# Patient Record
Sex: MALE | Race: WHITE | NOT HISPANIC OR LATINO | URBAN - METROPOLITAN AREA
[De-identification: names, ages, dates, MRNs, and addresses within clinical notes are randomized per-mention and may not be internally consistent; named-entity substitution may affect disease eponyms.]

---

## 2023-09-26 ENCOUNTER — OFFICE (OUTPATIENT)
Dept: URBAN - METROPOLITAN AREA CLINIC 13 | Facility: CLINIC | Age: 48
End: 2023-09-26

## 2023-09-26 VITALS
OXYGEN SATURATION: 99 % | HEART RATE: 101 BPM | HEIGHT: 71 IN | WEIGHT: 212 LBS | SYSTOLIC BLOOD PRESSURE: 122 MMHG | DIASTOLIC BLOOD PRESSURE: 80 MMHG

## 2023-09-26 DIAGNOSIS — R19.5 OTHER FECAL ABNORMALITIES: ICD-10-CM

## 2023-09-26 PROCEDURE — 99204 OFFICE O/P NEW MOD 45 MIN: CPT | Performed by: INTERNAL MEDICINE

## 2023-10-10 ENCOUNTER — AMBULATORY SURGICAL CENTER (OUTPATIENT)
Dept: URBAN - METROPOLITAN AREA SURGERY 4 | Facility: SURGERY | Age: 48
End: 2023-10-10
Payer: COMMERCIAL

## 2023-10-10 ENCOUNTER — OFFICE (OUTPATIENT)
Dept: URBAN - METROPOLITAN AREA PATHOLOGY 1 | Facility: PATHOLOGY | Age: 48
End: 2023-10-10
Payer: COMMERCIAL

## 2023-10-10 VITALS
HEART RATE: 76 BPM | HEART RATE: 91 BPM | RESPIRATION RATE: 13 BRPM | DIASTOLIC BLOOD PRESSURE: 68 MMHG | RESPIRATION RATE: 16 BRPM | OXYGEN SATURATION: 98 % | RESPIRATION RATE: 16 BRPM | HEIGHT: 71 IN | WEIGHT: 204 LBS | DIASTOLIC BLOOD PRESSURE: 83 MMHG | SYSTOLIC BLOOD PRESSURE: 113 MMHG | DIASTOLIC BLOOD PRESSURE: 71 MMHG | SYSTOLIC BLOOD PRESSURE: 108 MMHG | OXYGEN SATURATION: 99 % | HEART RATE: 73 BPM | SYSTOLIC BLOOD PRESSURE: 108 MMHG | DIASTOLIC BLOOD PRESSURE: 69 MMHG | OXYGEN SATURATION: 95 % | WEIGHT: 204 LBS | HEART RATE: 76 BPM | SYSTOLIC BLOOD PRESSURE: 109 MMHG | SYSTOLIC BLOOD PRESSURE: 107 MMHG | HEART RATE: 69 BPM | SYSTOLIC BLOOD PRESSURE: 102 MMHG | DIASTOLIC BLOOD PRESSURE: 68 MMHG | RESPIRATION RATE: 14 BRPM | HEART RATE: 77 BPM | DIASTOLIC BLOOD PRESSURE: 81 MMHG | OXYGEN SATURATION: 98 % | RESPIRATION RATE: 12 BRPM | HEART RATE: 70 BPM | HEART RATE: 77 BPM | OXYGEN SATURATION: 99 % | RESPIRATION RATE: 14 BRPM | DIASTOLIC BLOOD PRESSURE: 87 MMHG | RESPIRATION RATE: 18 BRPM | DIASTOLIC BLOOD PRESSURE: 66 MMHG | RESPIRATION RATE: 10 BRPM | RESPIRATION RATE: 11 BRPM | DIASTOLIC BLOOD PRESSURE: 74 MMHG | RESPIRATION RATE: 11 BRPM | DIASTOLIC BLOOD PRESSURE: 87 MMHG | OXYGEN SATURATION: 97 % | HEART RATE: 62 BPM | SYSTOLIC BLOOD PRESSURE: 136 MMHG | SYSTOLIC BLOOD PRESSURE: 102 MMHG | SYSTOLIC BLOOD PRESSURE: 134 MMHG | SYSTOLIC BLOOD PRESSURE: 139 MMHG | SYSTOLIC BLOOD PRESSURE: 105 MMHG | DIASTOLIC BLOOD PRESSURE: 71 MMHG | OXYGEN SATURATION: 95 % | SYSTOLIC BLOOD PRESSURE: 100 MMHG | HEART RATE: 70 BPM | SYSTOLIC BLOOD PRESSURE: 113 MMHG | SYSTOLIC BLOOD PRESSURE: 105 MMHG | RESPIRATION RATE: 10 BRPM | SYSTOLIC BLOOD PRESSURE: 136 MMHG | OXYGEN SATURATION: 100 % | DIASTOLIC BLOOD PRESSURE: 72 MMHG | SYSTOLIC BLOOD PRESSURE: 119 MMHG | DIASTOLIC BLOOD PRESSURE: 83 MMHG | OXYGEN SATURATION: 97 % | RESPIRATION RATE: 9 BRPM | SYSTOLIC BLOOD PRESSURE: 109 MMHG | TEMPERATURE: 98.1 F | RESPIRATION RATE: 13 BRPM | HEART RATE: 68 BPM | RESPIRATION RATE: 12 BRPM | SYSTOLIC BLOOD PRESSURE: 119 MMHG | HEART RATE: 62 BPM | HEART RATE: 74 BPM | HEART RATE: 74 BPM | DIASTOLIC BLOOD PRESSURE: 74 MMHG | SYSTOLIC BLOOD PRESSURE: 139 MMHG | OXYGEN SATURATION: 100 % | DIASTOLIC BLOOD PRESSURE: 76 MMHG | HEART RATE: 91 BPM | RESPIRATION RATE: 9 BRPM | DIASTOLIC BLOOD PRESSURE: 76 MMHG | HEART RATE: 68 BPM | DIASTOLIC BLOOD PRESSURE: 66 MMHG | RESPIRATION RATE: 18 BRPM | SYSTOLIC BLOOD PRESSURE: 107 MMHG | SYSTOLIC BLOOD PRESSURE: 100 MMHG | TEMPERATURE: 98.1 F | DIASTOLIC BLOOD PRESSURE: 69 MMHG | DIASTOLIC BLOOD PRESSURE: 81 MMHG | SYSTOLIC BLOOD PRESSURE: 134 MMHG | HEIGHT: 71 IN | HEART RATE: 69 BPM | HEART RATE: 73 BPM | DIASTOLIC BLOOD PRESSURE: 72 MMHG

## 2023-10-10 DIAGNOSIS — R19.5 OTHER FECAL ABNORMALITIES: ICD-10-CM

## 2023-10-10 DIAGNOSIS — D12.3 BENIGN NEOPLASM OF TRANSVERSE COLON: ICD-10-CM

## 2023-10-10 DIAGNOSIS — D12.5 BENIGN NEOPLASM OF SIGMOID COLON: ICD-10-CM

## 2023-10-10 DIAGNOSIS — K57.30 DIVERTICULOSIS OF LARGE INTESTINE WITHOUT PERFORATION OR ABS: ICD-10-CM

## 2023-10-10 DIAGNOSIS — K63.5 POLYP OF COLON: ICD-10-CM

## 2023-10-10 DIAGNOSIS — C18.7 MALIGNANT NEOPLASM OF SIGMOID COLON: ICD-10-CM

## 2023-10-10 PROCEDURE — 45385 COLONOSCOPY W/LESION REMOVAL: CPT | Performed by: INTERNAL MEDICINE

## 2023-10-10 PROCEDURE — 43235 EGD DIAGNOSTIC BRUSH WASH: CPT | Performed by: INTERNAL MEDICINE

## 2023-10-10 PROCEDURE — 88305 TISSUE EXAM BY PATHOLOGIST: CPT | Performed by: PATHOLOGY

## 2023-10-10 PROCEDURE — 88341 IMHCHEM/IMCYTCHM EA ADD ANTB: CPT | Performed by: PATHOLOGY

## 2023-10-10 PROCEDURE — 88342 IMHCHEM/IMCYTCHM 1ST ANTB: CPT | Performed by: PATHOLOGY

## 2023-10-10 NOTE — SERVICENOTES
Right colon examined twice GI [employer] artificial intelligence technology used to enhance polyp detection

## 2023-10-13 LAB
PDF: PDF REPORT: (no result)
PDF: PDF REPORT: (no result)

## 2023-10-16 ENCOUNTER — OFFICE (OUTPATIENT)
Dept: URBAN - METROPOLITAN AREA CLINIC 13 | Facility: CLINIC | Age: 48
End: 2023-10-16

## 2023-10-16 VITALS
DIASTOLIC BLOOD PRESSURE: 100 MMHG | OXYGEN SATURATION: 98 % | SYSTOLIC BLOOD PRESSURE: 160 MMHG | WEIGHT: 205 LBS | HEIGHT: 71 IN | HEART RATE: 96 BPM

## 2023-10-16 DIAGNOSIS — C18.7 MALIGNANT NEOPLASM OF SIGMOID COLON: ICD-10-CM

## 2023-10-16 PROCEDURE — 99214 OFFICE O/P EST MOD 30 MIN: CPT | Performed by: INTERNAL MEDICINE

## 2023-10-25 ENCOUNTER — AMBULATORY SURGICAL CENTER (OUTPATIENT)
Dept: URBAN - METROPOLITAN AREA SURGERY 4 | Facility: SURGERY | Age: 48
End: 2023-10-25
Payer: COMMERCIAL

## 2023-10-25 VITALS
DIASTOLIC BLOOD PRESSURE: 61 MMHG | SYSTOLIC BLOOD PRESSURE: 99 MMHG | SYSTOLIC BLOOD PRESSURE: 97 MMHG | DIASTOLIC BLOOD PRESSURE: 62 MMHG | HEART RATE: 73 BPM | OXYGEN SATURATION: 97 % | TEMPERATURE: 98 F | HEART RATE: 88 BPM | WEIGHT: 205 LBS | OXYGEN SATURATION: 94 % | DIASTOLIC BLOOD PRESSURE: 76 MMHG | OXYGEN SATURATION: 94 % | SYSTOLIC BLOOD PRESSURE: 123 MMHG | HEART RATE: 68 BPM | SYSTOLIC BLOOD PRESSURE: 105 MMHG | DIASTOLIC BLOOD PRESSURE: 69 MMHG | HEIGHT: 71 IN | DIASTOLIC BLOOD PRESSURE: 68 MMHG | OXYGEN SATURATION: 96 % | OXYGEN SATURATION: 98 % | HEART RATE: 75 BPM | HEART RATE: 70 BPM | RESPIRATION RATE: 16 BRPM | SYSTOLIC BLOOD PRESSURE: 107 MMHG | DIASTOLIC BLOOD PRESSURE: 79 MMHG | OXYGEN SATURATION: 97 % | SYSTOLIC BLOOD PRESSURE: 107 MMHG | SYSTOLIC BLOOD PRESSURE: 132 MMHG | OXYGEN SATURATION: 98 % | HEART RATE: 73 BPM | TEMPERATURE: 98 F | HEART RATE: 70 BPM | DIASTOLIC BLOOD PRESSURE: 79 MMHG | RESPIRATION RATE: 12 BRPM | SYSTOLIC BLOOD PRESSURE: 97 MMHG | DIASTOLIC BLOOD PRESSURE: 78 MMHG | WEIGHT: 205 LBS | RESPIRATION RATE: 14 BRPM | HEART RATE: 75 BPM | HEIGHT: 71 IN | SYSTOLIC BLOOD PRESSURE: 105 MMHG | DIASTOLIC BLOOD PRESSURE: 61 MMHG | RESPIRATION RATE: 16 BRPM | SYSTOLIC BLOOD PRESSURE: 132 MMHG | DIASTOLIC BLOOD PRESSURE: 68 MMHG | RESPIRATION RATE: 12 BRPM | OXYGEN SATURATION: 96 % | OXYGEN SATURATION: 95 % | HEART RATE: 64 BPM | RESPIRATION RATE: 10 BRPM | SYSTOLIC BLOOD PRESSURE: 123 MMHG | DIASTOLIC BLOOD PRESSURE: 62 MMHG | HEART RATE: 64 BPM | RESPIRATION RATE: 14 BRPM | RESPIRATION RATE: 10 BRPM | HEART RATE: 88 BPM | HEART RATE: 68 BPM | DIASTOLIC BLOOD PRESSURE: 78 MMHG | OXYGEN SATURATION: 95 % | DIASTOLIC BLOOD PRESSURE: 76 MMHG | SYSTOLIC BLOOD PRESSURE: 99 MMHG | DIASTOLIC BLOOD PRESSURE: 69 MMHG

## 2023-10-25 DIAGNOSIS — C18.7 MALIGNANT NEOPLASM OF SIGMOID COLON: ICD-10-CM

## 2023-10-25 PROCEDURE — 45335 SIGMOIDOSCOPY W/SUBMUC INJ: CPT | Performed by: INTERNAL MEDICINE

## 2024-10-28 ENCOUNTER — AMBULATORY SURGICAL CENTER (OUTPATIENT)
Dept: URBAN - METROPOLITAN AREA SURGERY 4 | Facility: SURGERY | Age: 49
End: 2024-10-28
Payer: COMMERCIAL

## 2024-10-28 VITALS
RESPIRATION RATE: 16 BRPM | SYSTOLIC BLOOD PRESSURE: 121 MMHG | HEART RATE: 59 BPM | SYSTOLIC BLOOD PRESSURE: 108 MMHG | RESPIRATION RATE: 28 BRPM | SYSTOLIC BLOOD PRESSURE: 148 MMHG | DIASTOLIC BLOOD PRESSURE: 82 MMHG | HEART RATE: 54 BPM | RESPIRATION RATE: 12 BRPM | HEART RATE: 68 BPM | DIASTOLIC BLOOD PRESSURE: 72 MMHG | DIASTOLIC BLOOD PRESSURE: 80 MMHG | SYSTOLIC BLOOD PRESSURE: 117 MMHG | HEART RATE: 65 BPM | SYSTOLIC BLOOD PRESSURE: 115 MMHG | OXYGEN SATURATION: 98 % | SYSTOLIC BLOOD PRESSURE: 116 MMHG | OXYGEN SATURATION: 99 % | HEART RATE: 89 BPM | DIASTOLIC BLOOD PRESSURE: 67 MMHG | TEMPERATURE: 98.4 F | DIASTOLIC BLOOD PRESSURE: 78 MMHG | DIASTOLIC BLOOD PRESSURE: 70 MMHG | RESPIRATION RATE: 11 BRPM | OXYGEN SATURATION: 96 % | OXYGEN SATURATION: 97 % | HEIGHT: 71 IN | HEART RATE: 66 BPM | DIASTOLIC BLOOD PRESSURE: 75 MMHG | RESPIRATION RATE: 18 BRPM | DIASTOLIC BLOOD PRESSURE: 71 MMHG | WEIGHT: 222 LBS | SYSTOLIC BLOOD PRESSURE: 124 MMHG | SYSTOLIC BLOOD PRESSURE: 144 MMHG

## 2024-10-28 DIAGNOSIS — K63.5 POLYP OF COLON: ICD-10-CM

## 2024-10-28 DIAGNOSIS — Z85.038 PERSONAL HISTORY OF OTHER MALIGNANT NEOPLASM OF LARGE INTEST: ICD-10-CM

## 2024-10-28 DIAGNOSIS — K57.30 DIVERTICULOSIS OF LARGE INTESTINE WITHOUT PERFORATION OR ABS: ICD-10-CM

## 2024-10-28 DIAGNOSIS — Z08 ENCOUNTER FOR FOLLOW-UP EXAMINATION AFTER COMPLETED TREATMEN: ICD-10-CM

## 2024-10-28 PROCEDURE — 45385 COLONOSCOPY W/LESION REMOVAL: CPT | Mod: 33 | Performed by: INTERNAL MEDICINE
